# Patient Record
Sex: FEMALE | Race: WHITE | Employment: FULL TIME | URBAN - METROPOLITAN AREA
[De-identification: names, ages, dates, MRNs, and addresses within clinical notes are randomized per-mention and may not be internally consistent; named-entity substitution may affect disease eponyms.]

---

## 2023-08-21 ENCOUNTER — APPOINTMENT (OUTPATIENT)
Dept: GENERAL RADIOLOGY | Age: 58
DRG: 065 | End: 2023-08-21
Payer: COMMERCIAL

## 2023-08-21 ENCOUNTER — APPOINTMENT (OUTPATIENT)
Dept: CT IMAGING | Age: 58
DRG: 065 | End: 2023-08-21
Payer: COMMERCIAL

## 2023-08-21 ENCOUNTER — HOSPITAL ENCOUNTER (INPATIENT)
Age: 58
LOS: 2 days | Discharge: HOME | DRG: 065 | End: 2023-08-23
Attending: EMERGENCY MEDICINE | Admitting: INTERNAL MEDICINE
Payer: COMMERCIAL

## 2023-08-21 ENCOUNTER — APPOINTMENT (OUTPATIENT)
Dept: NUCLEAR MEDICINE | Age: 58
DRG: 065 | End: 2023-08-21
Payer: COMMERCIAL

## 2023-08-21 ENCOUNTER — APPOINTMENT (OUTPATIENT)
Dept: MRI IMAGING | Age: 58
DRG: 065 | End: 2023-08-21
Payer: COMMERCIAL

## 2023-08-21 DIAGNOSIS — R29.898 RIGHT ARM WEAKNESS: ICD-10-CM

## 2023-08-21 DIAGNOSIS — E04.2 MULTIPLE THYROID NODULES: ICD-10-CM

## 2023-08-21 DIAGNOSIS — I63.9 CEREBROVASCULAR ACCIDENT (CVA), UNSPECIFIED MECHANISM (HCC): ICD-10-CM

## 2023-08-21 DIAGNOSIS — M25.511 ACUTE PAIN OF RIGHT SHOULDER: Primary | ICD-10-CM

## 2023-08-21 DIAGNOSIS — R94.31 ABNORMAL EKG: ICD-10-CM

## 2023-08-21 LAB
ALBUMIN SERPL-MCNC: 4.1 G/DL (ref 3.4–5)
ALBUMIN/GLOB SERPL: 1.2 {RATIO} (ref 1.1–2.2)
ALP SERPL-CCNC: 109 U/L (ref 40–129)
ALT SERPL-CCNC: 27 U/L (ref 10–40)
ANION GAP SERPL CALCULATED.3IONS-SCNC: 14 MMOL/L (ref 3–16)
ANTI-XA UNFRAC HEPARIN: <0.1 IU/ML (ref 0.3–0.7)
APTT BLD: 23.2 SEC (ref 22.7–35.9)
AST SERPL-CCNC: 22 U/L (ref 15–37)
BASOPHILS # BLD: 0.1 K/UL (ref 0–0.2)
BASOPHILS NFR BLD: 0.7 %
BILIRUB SERPL-MCNC: 0.9 MG/DL (ref 0–1)
BUN SERPL-MCNC: 9 MG/DL (ref 7–20)
CALCIUM SERPL-MCNC: 9.4 MG/DL (ref 8.3–10.6)
CHLORIDE SERPL-SCNC: 97 MMOL/L (ref 99–110)
CHP ED QC CHECK: 184
CO2 SERPL-SCNC: 27 MMOL/L (ref 21–32)
CREAT SERPL-MCNC: 1.1 MG/DL (ref 0.6–1.1)
CRP SERPL-MCNC: 18.9 MG/L (ref 0–5.1)
DEPRECATED RDW RBC AUTO: 14.5 % (ref 12.4–15.4)
EKG ATRIAL RATE: 64 BPM
EKG ATRIAL RATE: 71 BPM
EKG DIAGNOSIS: NORMAL
EKG DIAGNOSIS: NORMAL
EKG P AXIS: 30 DEGREES
EKG P AXIS: 9 DEGREES
EKG P-R INTERVAL: 148 MS
EKG P-R INTERVAL: 170 MS
EKG Q-T INTERVAL: 420 MS
EKG Q-T INTERVAL: 428 MS
EKG QRS DURATION: 76 MS
EKG QRS DURATION: 78 MS
EKG QTC CALCULATION (BAZETT): 441 MS
EKG QTC CALCULATION (BAZETT): 456 MS
EKG R AXIS: 43 DEGREES
EKG R AXIS: 56 DEGREES
EKG T AXIS: 123 DEGREES
EKG T AXIS: 135 DEGREES
EKG VENTRICULAR RATE: 64 BPM
EKG VENTRICULAR RATE: 71 BPM
EOSINOPHIL # BLD: 0.1 K/UL (ref 0–0.6)
EOSINOPHIL NFR BLD: 1.7 %
ERYTHROCYTE [SEDIMENTATION RATE] IN BLOOD BY WESTERGREN METHOD: 29 MM/HR (ref 0–30)
GFR SERPLBLD CREATININE-BSD FMLA CKD-EPI: 58 ML/MIN/{1.73_M2}
GLUCOSE BLD-MCNC: 125 MG/DL (ref 70–99)
GLUCOSE BLD-MCNC: 131 MG/DL (ref 70–99)
GLUCOSE BLD-MCNC: 138 MG/DL (ref 70–99)
GLUCOSE BLD-MCNC: 184 MG/DL (ref 70–99)
GLUCOSE SERPL-MCNC: 199 MG/DL (ref 70–99)
HCT VFR BLD AUTO: 46 % (ref 36–48)
HGB BLD-MCNC: 15.5 G/DL (ref 12–16)
INR PPP: 0.99 (ref 0.84–1.16)
LV EF: 55 %
LV EF: 60 %
LVEF MODALITY: NORMAL
LVEF MODALITY: NORMAL
LYMPHOCYTES # BLD: 1.8 K/UL (ref 1–5.1)
LYMPHOCYTES NFR BLD: 21.6 %
MCH RBC QN AUTO: 28.3 PG (ref 26–34)
MCHC RBC AUTO-ENTMCNC: 33.7 G/DL (ref 31–36)
MCV RBC AUTO: 84.1 FL (ref 80–100)
MONOCYTES # BLD: 0.7 K/UL (ref 0–1.3)
MONOCYTES NFR BLD: 7.8 %
NEUTROPHILS # BLD: 5.7 K/UL (ref 1.7–7.7)
NEUTROPHILS NFR BLD: 68.2 %
PERFORMED ON: ABNORMAL
PLATELET # BLD AUTO: 211 K/UL (ref 135–450)
PMV BLD AUTO: 9.3 FL (ref 5–10.5)
POTASSIUM SERPL-SCNC: 3.3 MMOL/L (ref 3.5–5.1)
PROT SERPL-MCNC: 7.4 G/DL (ref 6.4–8.2)
PROTHROMBIN TIME: 13.1 SEC (ref 11.5–14.8)
RBC # BLD AUTO: 5.47 M/UL (ref 4–5.2)
SODIUM SERPL-SCNC: 138 MMOL/L (ref 136–145)
TROPONIN, HIGH SENSITIVITY: 7 NG/L (ref 0–14)
TROPONIN, HIGH SENSITIVITY: 8 NG/L (ref 0–14)
TROPONIN, HIGH SENSITIVITY: 8 NG/L (ref 0–14)
TROPONIN, HIGH SENSITIVITY: 9 NG/L (ref 0–14)
WBC # BLD AUTO: 8.4 K/UL (ref 4–11)

## 2023-08-21 PROCEDURE — 6370000000 HC RX 637 (ALT 250 FOR IP): Performed by: NURSE PRACTITIONER

## 2023-08-21 PROCEDURE — 85610 PROTHROMBIN TIME: CPT

## 2023-08-21 PROCEDURE — 6360000002 HC RX W HCPCS: Performed by: EMERGENCY MEDICINE

## 2023-08-21 PROCEDURE — 96374 THER/PROPH/DIAG INJ IV PUSH: CPT

## 2023-08-21 PROCEDURE — 70496 CT ANGIOGRAPHY HEAD: CPT

## 2023-08-21 PROCEDURE — 99285 EMERGENCY DEPT VISIT HI MDM: CPT

## 2023-08-21 PROCEDURE — 85652 RBC SED RATE AUTOMATED: CPT

## 2023-08-21 PROCEDURE — 36415 COLL VENOUS BLD VENIPUNCTURE: CPT

## 2023-08-21 PROCEDURE — 93306 TTE W/DOPPLER COMPLETE: CPT

## 2023-08-21 PROCEDURE — 6360000002 HC RX W HCPCS: Performed by: INTERNAL MEDICINE

## 2023-08-21 PROCEDURE — 85520 HEPARIN ASSAY: CPT

## 2023-08-21 PROCEDURE — 93005 ELECTROCARDIOGRAM TRACING: CPT | Performed by: EMERGENCY MEDICINE

## 2023-08-21 PROCEDURE — A9579 GAD-BASE MR CONTRAST NOS,1ML: HCPCS | Performed by: INTERNAL MEDICINE

## 2023-08-21 PROCEDURE — 6360000004 HC RX CONTRAST MEDICATION: Performed by: EMERGENCY MEDICINE

## 2023-08-21 PROCEDURE — 70450 CT HEAD/BRAIN W/O DYE: CPT

## 2023-08-21 PROCEDURE — 93010 ELECTROCARDIOGRAM REPORT: CPT | Performed by: INTERNAL MEDICINE

## 2023-08-21 PROCEDURE — 93017 CV STRESS TEST TRACING ONLY: CPT

## 2023-08-21 PROCEDURE — 70553 MRI BRAIN STEM W/O & W/DYE: CPT

## 2023-08-21 PROCEDURE — 3430000000 HC RX DIAGNOSTIC RADIOPHARMACEUTICAL: Performed by: INTERNAL MEDICINE

## 2023-08-21 PROCEDURE — 99254 IP/OBS CNSLTJ NEW/EST MOD 60: CPT | Performed by: INTERNAL MEDICINE

## 2023-08-21 PROCEDURE — 6360000004 HC RX CONTRAST MEDICATION: Performed by: INTERNAL MEDICINE

## 2023-08-21 PROCEDURE — 6370000000 HC RX 637 (ALT 250 FOR IP): Performed by: EMERGENCY MEDICINE

## 2023-08-21 PROCEDURE — 85025 COMPLETE CBC W/AUTO DIFF WBC: CPT

## 2023-08-21 PROCEDURE — 85730 THROMBOPLASTIN TIME PARTIAL: CPT

## 2023-08-21 PROCEDURE — 2060000000 HC ICU INTERMEDIATE R&B

## 2023-08-21 PROCEDURE — 84484 ASSAY OF TROPONIN QUANT: CPT

## 2023-08-21 PROCEDURE — 71045 X-RAY EXAM CHEST 1 VIEW: CPT

## 2023-08-21 PROCEDURE — 78452 HT MUSCLE IMAGE SPECT MULT: CPT

## 2023-08-21 PROCEDURE — A9502 TC99M TETROFOSMIN: HCPCS | Performed by: INTERNAL MEDICINE

## 2023-08-21 PROCEDURE — 86140 C-REACTIVE PROTEIN: CPT

## 2023-08-21 PROCEDURE — 75571 CT HRT W/O DYE W/CA TEST: CPT

## 2023-08-21 PROCEDURE — 80053 COMPREHEN METABOLIC PANEL: CPT

## 2023-08-21 PROCEDURE — 2580000003 HC RX 258: Performed by: NURSE PRACTITIONER

## 2023-08-21 RX ORDER — MORPHINE SULFATE 4 MG/ML
4 INJECTION, SOLUTION INTRAMUSCULAR; INTRAVENOUS ONCE
Status: DISCONTINUED | OUTPATIENT
Start: 2023-08-21 | End: 2023-08-23 | Stop reason: HOSPADM

## 2023-08-21 RX ORDER — ATORVASTATIN CALCIUM 80 MG/1
80 TABLET, FILM COATED ORAL DAILY
COMMUNITY

## 2023-08-21 RX ORDER — ATORVASTATIN CALCIUM 80 MG/1
80 TABLET, FILM COATED ORAL DAILY
Status: DISCONTINUED | OUTPATIENT
Start: 2023-08-21 | End: 2023-08-23 | Stop reason: HOSPADM

## 2023-08-21 RX ORDER — SODIUM CHLORIDE 9 MG/ML
INJECTION, SOLUTION INTRAVENOUS PRN
Status: DISCONTINUED | OUTPATIENT
Start: 2023-08-21 | End: 2023-08-23 | Stop reason: HOSPADM

## 2023-08-21 RX ORDER — HEPARIN SODIUM 10000 [USP'U]/100ML
930 INJECTION, SOLUTION INTRAVENOUS CONTINUOUS
Status: DISCONTINUED | OUTPATIENT
Start: 2023-08-21 | End: 2023-08-21

## 2023-08-21 RX ORDER — HEPARIN SODIUM 1000 [USP'U]/ML
4000 INJECTION, SOLUTION INTRAVENOUS; SUBCUTANEOUS PRN
Status: DISCONTINUED | OUTPATIENT
Start: 2023-08-21 | End: 2023-08-22 | Stop reason: ALTCHOICE

## 2023-08-21 RX ORDER — GLUCAGON 1 MG/ML
1 KIT INJECTION PRN
Status: DISCONTINUED | OUTPATIENT
Start: 2023-08-21 | End: 2023-08-23 | Stop reason: HOSPADM

## 2023-08-21 RX ORDER — ONDANSETRON 2 MG/ML
4 INJECTION INTRAMUSCULAR; INTRAVENOUS EVERY 6 HOURS PRN
Status: DISCONTINUED | OUTPATIENT
Start: 2023-08-21 | End: 2023-08-23 | Stop reason: HOSPADM

## 2023-08-21 RX ORDER — METHOCARBAMOL 750 MG/1
750 TABLET, FILM COATED ORAL 3 TIMES DAILY PRN
Status: DISCONTINUED | OUTPATIENT
Start: 2023-08-21 | End: 2023-08-22

## 2023-08-21 RX ORDER — HYDROCHLOROTHIAZIDE 25 MG/1
37.5 TABLET ORAL DAILY
Status: DISCONTINUED | OUTPATIENT
Start: 2023-08-21 | End: 2023-08-23 | Stop reason: HOSPADM

## 2023-08-21 RX ORDER — ASPIRIN 81 MG/1
81 TABLET, CHEWABLE ORAL DAILY
Status: DISCONTINUED | OUTPATIENT
Start: 2023-08-21 | End: 2023-08-22

## 2023-08-21 RX ORDER — ASPIRIN 81 MG/1
324 TABLET, CHEWABLE ORAL ONCE
Status: COMPLETED | OUTPATIENT
Start: 2023-08-21 | End: 2023-08-21

## 2023-08-21 RX ORDER — DEXTROSE MONOHYDRATE 100 MG/ML
INJECTION, SOLUTION INTRAVENOUS CONTINUOUS PRN
Status: DISCONTINUED | OUTPATIENT
Start: 2023-08-21 | End: 2023-08-23 | Stop reason: HOSPADM

## 2023-08-21 RX ORDER — ACETAMINOPHEN 325 MG/1
650 TABLET ORAL EVERY 6 HOURS PRN
Status: DISCONTINUED | OUTPATIENT
Start: 2023-08-21 | End: 2023-08-23 | Stop reason: HOSPADM

## 2023-08-21 RX ORDER — ACETAMINOPHEN 650 MG/1
650 SUPPOSITORY RECTAL EVERY 6 HOURS PRN
Status: DISCONTINUED | OUTPATIENT
Start: 2023-08-21 | End: 2023-08-23 | Stop reason: HOSPADM

## 2023-08-21 RX ORDER — HYDROCHLOROTHIAZIDE 25 MG/1
37.5 TABLET ORAL DAILY
COMMUNITY

## 2023-08-21 RX ORDER — HEPARIN SODIUM 1000 [USP'U]/ML
2000 INJECTION, SOLUTION INTRAVENOUS; SUBCUTANEOUS PRN
Status: DISCONTINUED | OUTPATIENT
Start: 2023-08-21 | End: 2023-08-22 | Stop reason: ALTCHOICE

## 2023-08-21 RX ORDER — SODIUM CHLORIDE 0.9 % (FLUSH) 0.9 %
5-40 SYRINGE (ML) INJECTION PRN
Status: DISCONTINUED | OUTPATIENT
Start: 2023-08-21 | End: 2023-08-23 | Stop reason: HOSPADM

## 2023-08-21 RX ORDER — INSULIN LISPRO 100 [IU]/ML
0-4 INJECTION, SOLUTION INTRAVENOUS; SUBCUTANEOUS
Status: DISCONTINUED | OUTPATIENT
Start: 2023-08-21 | End: 2023-08-23 | Stop reason: HOSPADM

## 2023-08-21 RX ORDER — REGADENOSON 0.08 MG/ML
0.4 INJECTION, SOLUTION INTRAVENOUS
Status: COMPLETED | OUTPATIENT
Start: 2023-08-21 | End: 2023-08-21

## 2023-08-21 RX ORDER — SODIUM CHLORIDE 0.9 % (FLUSH) 0.9 %
5-40 SYRINGE (ML) INJECTION EVERY 12 HOURS SCHEDULED
Status: DISCONTINUED | OUTPATIENT
Start: 2023-08-21 | End: 2023-08-23 | Stop reason: HOSPADM

## 2023-08-21 RX ORDER — HEPARIN SODIUM 1000 [USP'U]/ML
4000 INJECTION, SOLUTION INTRAVENOUS; SUBCUTANEOUS ONCE
Status: COMPLETED | OUTPATIENT
Start: 2023-08-21 | End: 2023-08-21

## 2023-08-21 RX ORDER — POLYETHYLENE GLYCOL 3350 17 G/17G
17 POWDER, FOR SOLUTION ORAL DAILY PRN
Status: DISCONTINUED | OUTPATIENT
Start: 2023-08-21 | End: 2023-08-23 | Stop reason: HOSPADM

## 2023-08-21 RX ORDER — INSULIN LISPRO 100 [IU]/ML
0-4 INJECTION, SOLUTION INTRAVENOUS; SUBCUTANEOUS NIGHTLY
Status: DISCONTINUED | OUTPATIENT
Start: 2023-08-21 | End: 2023-08-23 | Stop reason: HOSPADM

## 2023-08-21 RX ORDER — CARVEDILOL 6.25 MG/1
6.25 TABLET ORAL 2 TIMES DAILY WITH MEALS
Status: ON HOLD | COMMUNITY
End: 2023-08-23 | Stop reason: HOSPADM

## 2023-08-21 RX ORDER — NITROGLYCERIN 0.4 MG/1
0.4 TABLET SUBLINGUAL EVERY 5 MIN PRN
Status: DISCONTINUED | OUTPATIENT
Start: 2023-08-21 | End: 2023-08-23 | Stop reason: HOSPADM

## 2023-08-21 RX ORDER — CARVEDILOL 6.25 MG/1
6.25 TABLET ORAL 2 TIMES DAILY WITH MEALS
Status: DISCONTINUED | OUTPATIENT
Start: 2023-08-21 | End: 2023-08-22

## 2023-08-21 RX ORDER — ONDANSETRON 4 MG/1
4 TABLET, ORALLY DISINTEGRATING ORAL EVERY 8 HOURS PRN
Status: DISCONTINUED | OUTPATIENT
Start: 2023-08-21 | End: 2023-08-23 | Stop reason: HOSPADM

## 2023-08-21 RX ADMIN — HEPARIN SODIUM 4000 UNITS: 1000 INJECTION INTRAVENOUS; SUBCUTANEOUS at 09:23

## 2023-08-21 RX ADMIN — TETROFOSMIN 33.9 MILLICURIE: 1.38 INJECTION, POWDER, LYOPHILIZED, FOR SOLUTION INTRAVENOUS at 11:31

## 2023-08-21 RX ADMIN — ASPIRIN 81 MG 243 MG: 81 TABLET ORAL at 09:19

## 2023-08-21 RX ADMIN — HEPARIN SODIUM 930 UNITS/HR: 10000 INJECTION, SOLUTION INTRAVENOUS at 09:24

## 2023-08-21 RX ADMIN — GADOTERIDOL 20 ML: 279.3 INJECTION, SOLUTION INTRAVENOUS at 17:10

## 2023-08-21 RX ADMIN — REGADENOSON 0.4 MG: 0.08 INJECTION, SOLUTION INTRAVENOUS at 11:31

## 2023-08-21 RX ADMIN — CARVEDILOL 6.25 MG: 6.25 TABLET, FILM COATED ORAL at 16:47

## 2023-08-21 RX ADMIN — TETROFOSMIN 11.8 MILLICURIE: 1.38 INJECTION, POWDER, LYOPHILIZED, FOR SOLUTION INTRAVENOUS at 10:09

## 2023-08-21 RX ADMIN — METFORMIN HYDROCHLORIDE 500 MG: 500 TABLET, FILM COATED ORAL at 16:47

## 2023-08-21 RX ADMIN — Medication 10 ML: at 20:03

## 2023-08-21 RX ADMIN — METHOCARBAMOL 750 MG: 750 TABLET ORAL at 20:02

## 2023-08-21 RX ADMIN — IOPAMIDOL 75 ML: 755 INJECTION, SOLUTION INTRAVENOUS at 07:44

## 2023-08-21 RX ADMIN — SERTRALINE 50 MG: 50 TABLET, FILM COATED ORAL at 20:02

## 2023-08-21 ASSESSMENT — PAIN SCALES - GENERAL
PAINLEVEL_OUTOF10: 0

## 2023-08-21 NOTE — ED PROVIDER NOTES
EMERGENCY DEPARTMENT ENCOUNTER        Pt Name: Katey Riojas  MRN: [id number]  Birthdate 1965  Date of evaluation: 8/21/2023  Provider: Snow Schroeder MD  PCP: No primary care provider on file. CHIEF COMPLAINT       Chief Complaint   Patient presents with    Extremity Weakness    Shoulder Pain     Pt was on way to  and developed shoulder pain that goes into her neck, pt feels weaker than usual . States it started this am at 6pm . States due to pain and numbleness it is hard to move right arm       HISTORY OFPRESENT ILLNESS   (Location/Symptom, Timing/Onset, Context/Setting, Quality, Duration, Modifying Factors,Severity)  Note limiting factors. Katey Riojas is a 59 y.o. female presenting today due to concern for developing some right shoulder pain going up into the neck yesterday around 2 PM that lasted for a few moments but ultimately went away but then she woke up at 6 AM this morning complaining of the right shoulder and neck pain and also having weakness in her right arm. She had a prior stroke involving the right arm and therefore states she does not always write very well but felt like she had worsening weakness in the arm this morning. She denies any symptoms in the face or leg. No chest pain or shortness of breath. She does report having prior intracranial hemorrhage. She denies any significant headache. No falls or trauma. No weakness elsewhere on the body other than the right arm. No visual concerns. Due to concern for the persistent right shoulder and neck pain since waking up this morning, she came to the ED for further assessment. She went to bed at 9 PM last night feeling normal.        REVIEW OF SYSTEMS    (2-9 systems for level 4, 10 or more for level 5)     Review of Systems   Constitutional:  Negative for fever. Eyes:  Negative for visual disturbance. Respiratory:  Negative for chest tightness and shortness of breath.     Cardiovascular:  Negative for chest chloride flush 0.9 % injection 5-40 mL (10 mLs IntraVENous Given 8/22/23 2035)   sodium chloride flush 0.9 % injection 5-40 mL (has no administration in time range)   0.9 % sodium chloride infusion (has no administration in time range)   ondansetron (ZOFRAN-ODT) disintegrating tablet 4 mg (has no administration in time range)     Or   ondansetron (ZOFRAN) injection 4 mg (has no administration in time range)   acetaminophen (TYLENOL) tablet 650 mg (has no administration in time range)     Or   acetaminophen (TYLENOL) suppository 650 mg (has no administration in time range)   polyethylene glycol (GLYCOLAX) packet 17 g (has no administration in time range)   morphine sulfate (PF) injection 4 mg (4 mg IntraVENous Not Given 8/21/23 1959)   nitroGLYCERIN (NITROSTAT) SL tablet 0.4 mg (has no administration in time range)   glucose chewable tablet 16 g (has no administration in time range)   dextrose bolus 10% 125 mL (has no administration in time range)     Or   dextrose bolus 10% 250 mL (has no administration in time range)   glucagon injection 1 mg (has no administration in time range)   dextrose 10 % infusion (has no administration in time range)   insulin lispro (HUMALOG) injection vial 0-4 Units (0 Units SubCUTAneous Not Given 8/22/23 1639)   insulin lispro (HUMALOG) injection vial 0-4 Units (0 Units SubCUTAneous Not Given 8/22/23 2036)   ketorolac (TORADOL) injection 30 mg (30 mg IntraVENous Given 8/22/23 1815)   cyclobenzaprine (FLEXERIL) tablet 5 mg (has no administration in time range)   clopidogrel (PLAVIX) tablet 75 mg (75 mg Oral Given 8/22/23 1317)   enoxaparin Sodium (LOVENOX) injection 30 mg (30 mg SubCUTAneous Given 8/22/23 2035)   carvedilol (COREG) tablet 12.5 mg (12.5 mg Oral Given 8/22/23 1815)   iopamidol (ISOVUE-370) 76 % injection 75 mL (75 mLs IntraVENous Given 8/21/23 0744)   aspirin chewable tablet 324 mg (243 mg Oral Given 8/21/23 0919)   heparin (porcine) injection 4,000 Units (4,000 Units

## 2023-08-21 NOTE — ED NOTES
@3638 Dr. Akiko Sullivan consulted with  stroke team   @9820 Dr. Akiko Sullivan called Code stroke  @3020 called ct bed 1 ready   @0713 lab called      Arben Luna  08/21/23 9272

## 2023-08-21 NOTE — CONSULTS
Pharmacy to Manage Heparin Infusion per [facility]    Dx: CAD/STEMI/NSTEMI/UA/AFIB  Patient weight = 108.9 kg (will use adjusted wt if actual body weight > 120% ideal body weight). Dosing weight: 77.8 kg (ABW)  Baseline aPTT = 23.2 sec at 0720. Oral factor Xa-inhibitors may alter and elevate anti-Xa levels used for unfractionated heparin monitoring. As a result, anti-Xa monitoring is not accurate while Xa-inhibitor activity is detectable. Utilize aPTT monitoring when patient received an oral factor Xa-inhibitor (apixaban, betrixaban, edoxaban or rivaroxaban) within 72 hours prior to admission (please document last administration time). The goal is to allow a washout of oral factor Xa-inhibitors by using aPTT for 72 hours, then change to ant-Xa levels for UFH. Heparin (weight-based) Infusion: CAD/STEMI/NSTEMI/UA/AFib)   Heparin 60 units/kg IVP bolus (max 4,000 units) followed by Heparin infusion at 12 units/kg/hr (recommended max initial rate: 1000 units/hr). Recheck anti-Xa (unless aPTT being used) in 6 hours. Goal anti-Xa 0.3-0.7 IU/mL  Goal aPTT =  seconds. Pharmacy to manage Heparin - contact for questions. Heparin 60 units/kg IV x 1 (max 4,000 units), then 12 units/kg/hr (recommended max initial rate 1,000 units/hr). Adjust infusion rate based off anti-Xa results below. anti-Xa < 0.1    Heparin 60 units/kg bolus  Increase infusion by 4 units/kg/hr  anti-Xa 0.1-0.29 Heparin 30 units/kg bolus Increase infusion by 2 units/kg/hr  anti-Xa 0.3-0.7    No bolus No change   anti-Xa 0.71-0.8   No bolus Decrease infusion by 1 units/kg/hr  anti-Xa 0.81-0.99    No bolus Decrease infusion by 2 units/kg/hr  anti-Xa 1 or more     Hold heparin for 1 hour Decrease infusion by 3 units/kg/hr    Obtain anti-Xa hours after bolus and 6 hours after any dose change until two consecutive therapeutic anti-Xa are achieved- then daily.     Daniela Rawls, PharmD 8/21/2023  9:15 AM

## 2023-08-21 NOTE — ED NOTES
@8553 called  stroke team for consult per Dr. Akiko Sullivan   RE:right arm/shoulder pain  @0791  stroke team called back and spoke to Dr. Annita Muñoz  08/21/23 9575

## 2023-08-21 NOTE — PROGRESS NOTES
A Sylvia Myoview stress test was completed on this patient as ordered. The patient tolerated the procedure well. Awaiting stress imaging at this time.

## 2023-08-21 NOTE — ED NOTES
Report given to C4 RN. Questions answered, care transferred. Pt still in stress at this time.  RN notified Stress lab as well to take pt upstairs to C4 when finished with testing     Wade Miller RN  08/21/23 1037

## 2023-08-21 NOTE — CONSULTS
[address] [place] [address]  [phone]      Attending Physician: Yogi Johnson MD  Reason for Consultation/Chief Complaint: Right arm pain    Subjective   History of Present Illness:  Pamela Morrissey. Kalee Nettles is a 59 y.o. patient who presented to the hospital with complaints of right arm pain since yesterday, patient says this was somewhat sudden onset, she noted no falls or trauma. She describes the pain mainly in the right posterior shoulder region as well as into the right posterior neck region. She has not had such symptoms before. She denies chest pain or left arm pain. She notes occasional nausea but no sweating. She says when the pain became severe she did notice associated shortness of breath but otherwise has no shortness of breath. Recently she has been working with her primary care physician for uncontrolled hypertension and had hydrochlorothiazide doubled and had the addition of carvedilol with improvement in blood pressure. Her daughter is at the bedside is able to provide/confirm history. In the emergency room EKG initially raised concern for inferior ST elevation as well as lateral ST/T changes. Initially old EKGs were not available for review however patient did note she had a recent work-up and has had a history of 3 strokes most recently in June 2023 at [facility] and those records were reviewed and reports that indicate she has had abnormalities of her inferolateral region on her EKG previously. Initial work-up included CT scan of the head which was negative for stroke. Troponin was negative. Patient denies prior cardiac disease or cardiology evaluation. Chronically, patient has had hypertension, as noted above, and recent changes in medications with improvement in control, she has had hypercholesterolemia, has been on statin. She has had a history of stroke as noted above.   Symptoms of the stroke have included right arm numbness as well as with difficulty changes/inversions, this has been noted on prior EKG from 2022 at [facility] with a written report indicating inferior infarct. Echo:    June 2023 at [facility] with normal left ventricular function    Stress Test:    2022 at [facility] was negative    Cath: None    Studies:     Cxr    IMPRESSION:  Lateral left basilar opacity, which could reflect atelectasis, airspace  disease, or soft tissue attenuation artifact. There is also a possible 7 mm  left apical pulmonary nodule. PA and lateral chest radiograph versus CT  could be performed for further evaluation. I have reviewed labs and imaging/xray/diagnostic testing in this note. Assessment and Plan       Abnormal EKG, this has been reported in a similar fashion and prior EKG, would evaluate further due to this as well as right arm pain which is an atypical possible anginal equivalent although this seems less likely due to reproducibility of pain with palpation. As such, would plan on serial troponin, plan on stress testing. Discussed with patient and daughter at bedside, they understand this approach including risk/benefits/alternatives/outcomes and wished to proceed this manner. It would be reasonable to start heparin in the interim. Continue aspirin    Hypertension, resume home medication    Hyperlipidemia, resume statin      History of stroke, consider follow-up MRI, will go ahead and place orders for that    Addendum,     Stress test reviewed, d/w pt and daughter, plan outpt f/u, do not feel cath highly indicated at this time given neg trop, nml lvef on echo and small defect on stress test.  Plan on outpt f/u to reassess this. Pt/family agree. Can consider ct ca score either as inpt or outpt. No further inpatient cardiac workup or treatment planned, will sign off, please call with questions. Thank you for allowing us to participate in the care of Fatimah Ross Guillaume.  Please call me with any questions ([phone])

## 2023-08-21 NOTE — H&P
Hospital Medicine History & Physical      Date of Admission: 8/21/2023    Date of Service:  Pt seen/examined on 8/21/2023    [x]Admitted to Inpatient with expected LOS greater than two midnights due to medical therapy. []Placed in Observation status. Chief Admission Complaint:  Left neck and shoulder pain     Presenting Admission History: This is a pleasant 17-year-old female who presented to the [facility] emergency department with complaints of right neck and arm pain that started yesterday. She says it was sudden on onset and she had no trauma or falls. She tells me that the pain is mainly in the right posterior shoulder region as well as into the right posterior neck area it does radiate down her arms. .  She has never had these symptoms prior to this presentation. She tells me she does not have any chest pain, left arm pain, shortness of breath, fevers, chills, cough or bowel or bladder issues. She does say that she has some occasional nausea but she believes this is brought on by this severe pain. She also mention that she has some shortness of breath when the pain presents. She has primary hypertension and she has been working closely with her primary care physician Dr. Sneha Uribe of  and recently was placed on carvedilol 6.25 and hydrochlorothiazide seen was doubled in strength to 37.25. Her daughter is at the bedside and she is able to provide and confirm this history. In the emergency room EKG raised concerns for inferior ST elevation as well as lateral ST/T wave changes. Unfortunately there were no EKGs available for review however the patient did note she had a recent work-up and has had a history of 3 strokes most recently in June 2023 at the [facility]. I was able to finally review those records. She did have a lacunar stroke and a left frontal lobe posterior corona radiata CVA.   She does have some abnormalities seen on her EKG previously in the inferior BRAIN/VENTRICLES: There is no acute intracranial hemorrhage, mass effect or midline shift. No abnormal extra-axial fluid collection. The gray-white differentiation is maintained without evidence of an acute infarct. There is no evidence of hydrocephalus. Moderate to extensive periventricular deep and subcortical white matter low attenuation noted. Old lacune is noted in bilateral basal ganglia ORBITS: The visualized portion of the orbits demonstrate no acute abnormality. SINUSES: 3 cm lobulated retention cyst left maxillary sinus. Partial opacification right sphenoid sinus. Partial opacification of few left mastoid air cells. The other paranasal sinuses are clear. SOFT TISSUES/SKULL:  No acute abnormality of the visualized skull or soft tissues. No acute intracranial abnormality. Moderate to extensive white matter changes in the brain, likely related to chronic small vessel ischemia. XR CHEST PORTABLE    Result Date: 8/21/2023  EXAMINATION: ONE XRAY VIEW OF THE CHEST 8/21/2023 7:25 am COMPARISON: None. HISTORY: ORDERING SYSTEM PROVIDED HISTORY: right shoulder pain TECHNOLOGIST PROVIDED HISTORY: Reason for exam:->right shoulder pain Reason for Exam: right shoulder pain, h/o stroke FINDINGS: Cardiac leads project over the chest.  Attenuation by soft tissue in the lower hemithoraces. Lateral left base is not well visualized. Elsewhere, no focal airspace disease is seen. No pneumothorax. Cardiac and mediastinal silhouettes are reflective of patient rotation. 7 mm nodular density projects over the anterior left 1st rib, medial left clavicle. Lateral left basilar opacity, which could reflect atelectasis, airspace disease, or soft tissue attenuation artifact. There is also a possible 7 mm left apical pulmonary nodule. PA and lateral chest radiograph versus CT could be performed for further evaluation.      CTA HEAD NECK W CONTRAST    Result Date: 8/21/2023  EXAMINATION: CTA OF THE HEAD AND NECK

## 2023-08-21 NOTE — ED NOTES
Pt being taken to stress lab via stretcher by cardiac nurse, Madalyn Scott. Pt in NAD, RR even and unlabored. VSS upon departure.      Jose Antonio Hernandez RN  08/21/23 1000

## 2023-08-21 NOTE — ED NOTES
@6635 called cardiology for consult per Dr. Svitlana Baum   RE:right shoulder pain  @5083 Dr. Jose M Campbell called back and spoke to Dr. Monik Wong  08/21/23 2054

## 2023-08-22 ENCOUNTER — APPOINTMENT (OUTPATIENT)
Dept: GENERAL RADIOLOGY | Age: 58
DRG: 065 | End: 2023-08-22
Payer: COMMERCIAL

## 2023-08-22 ENCOUNTER — APPOINTMENT (OUTPATIENT)
Dept: CT IMAGING | Age: 58
DRG: 065 | End: 2023-08-22
Payer: COMMERCIAL

## 2023-08-22 ENCOUNTER — APPOINTMENT (OUTPATIENT)
Dept: MRI IMAGING | Age: 58
DRG: 065 | End: 2023-08-22
Payer: COMMERCIAL

## 2023-08-22 LAB
ALBUMIN SERPL-MCNC: 3.6 G/DL (ref 3.4–5)
ALBUMIN/GLOB SERPL: 1.1 {RATIO} (ref 1.1–2.2)
ALP SERPL-CCNC: 98 U/L (ref 40–129)
ALT SERPL-CCNC: 23 U/L (ref 10–40)
ANION GAP SERPL CALCULATED.3IONS-SCNC: 11 MMOL/L (ref 3–16)
AST SERPL-CCNC: 18 U/L (ref 15–37)
BILIRUB SERPL-MCNC: 0.7 MG/DL (ref 0–1)
BUN SERPL-MCNC: 11 MG/DL (ref 7–20)
CALCIUM SERPL-MCNC: 9 MG/DL (ref 8.3–10.6)
CHLORIDE SERPL-SCNC: 99 MMOL/L (ref 99–110)
CHOLEST SERPL-MCNC: 128 MG/DL (ref 0–199)
CO2 SERPL-SCNC: 28 MMOL/L (ref 21–32)
CREAT SERPL-MCNC: 1 MG/DL (ref 0.6–1.1)
DEPRECATED RDW RBC AUTO: 14.9 % (ref 12.4–15.4)
EST. AVERAGE GLUCOSE BLD GHB EST-MCNC: 208.7 MG/DL
GFR SERPLBLD CREATININE-BSD FMLA CKD-EPI: >60 ML/MIN/{1.73_M2}
GLUCOSE BLD-MCNC: 161 MG/DL (ref 70–99)
GLUCOSE BLD-MCNC: 166 MG/DL (ref 70–99)
GLUCOSE BLD-MCNC: 177 MG/DL (ref 70–99)
GLUCOSE BLD-MCNC: 179 MG/DL (ref 70–99)
GLUCOSE SERPL-MCNC: 169 MG/DL (ref 70–99)
HBA1C MFR BLD: 8.9 %
HCT VFR BLD AUTO: 42.7 % (ref 36–48)
HDLC SERPL-MCNC: 31 MG/DL (ref 40–60)
HGB BLD-MCNC: 14.5 G/DL (ref 12–16)
LDLC SERPL CALC-MCNC: 71 MG/DL
MAGNESIUM SERPL-MCNC: 2.3 MG/DL (ref 1.8–2.4)
MCH RBC QN AUTO: 28.9 PG (ref 26–34)
MCHC RBC AUTO-ENTMCNC: 34 G/DL (ref 31–36)
MCV RBC AUTO: 84.9 FL (ref 80–100)
PERFORMED ON: ABNORMAL
PLATELET # BLD AUTO: 166 K/UL (ref 135–450)
PMV BLD AUTO: 9.2 FL (ref 5–10.5)
POTASSIUM SERPL-SCNC: 3.4 MMOL/L (ref 3.5–5.1)
PROT SERPL-MCNC: 7 G/DL (ref 6.4–8.2)
RBC # BLD AUTO: 5.03 M/UL (ref 4–5.2)
SODIUM SERPL-SCNC: 138 MMOL/L (ref 136–145)
TRIGL SERPL-MCNC: 130 MG/DL (ref 0–150)
TSH SERPL DL<=0.005 MIU/L-ACNC: 1.2 UIU/ML (ref 0.27–4.2)
VLDLC SERPL CALC-MCNC: 26 MG/DL
WBC # BLD AUTO: 8.6 K/UL (ref 4–11)

## 2023-08-22 PROCEDURE — 6370000000 HC RX 637 (ALT 250 FOR IP): Performed by: NURSE PRACTITIONER

## 2023-08-22 PROCEDURE — 80053 COMPREHEN METABOLIC PANEL: CPT

## 2023-08-22 PROCEDURE — 72040 X-RAY EXAM NECK SPINE 2-3 VW: CPT

## 2023-08-22 PROCEDURE — 80061 LIPID PANEL: CPT

## 2023-08-22 PROCEDURE — 72141 MRI NECK SPINE W/O DYE: CPT

## 2023-08-22 PROCEDURE — 83735 ASSAY OF MAGNESIUM: CPT

## 2023-08-22 PROCEDURE — 99223 1ST HOSP IP/OBS HIGH 75: CPT | Performed by: NURSE PRACTITIONER

## 2023-08-22 PROCEDURE — 84443 ASSAY THYROID STIM HORMONE: CPT

## 2023-08-22 PROCEDURE — 83036 HEMOGLOBIN GLYCOSYLATED A1C: CPT

## 2023-08-22 PROCEDURE — 36415 COLL VENOUS BLD VENIPUNCTURE: CPT

## 2023-08-22 PROCEDURE — 6360000002 HC RX W HCPCS: Performed by: NURSE PRACTITIONER

## 2023-08-22 PROCEDURE — 2060000000 HC ICU INTERMEDIATE R&B

## 2023-08-22 PROCEDURE — 2580000003 HC RX 258: Performed by: NURSE PRACTITIONER

## 2023-08-22 PROCEDURE — 71250 CT THORAX DX C-: CPT

## 2023-08-22 PROCEDURE — 85027 COMPLETE CBC AUTOMATED: CPT

## 2023-08-22 RX ORDER — CYCLOBENZAPRINE HCL 10 MG
5 TABLET ORAL 3 TIMES DAILY PRN
Status: DISCONTINUED | OUTPATIENT
Start: 2023-08-22 | End: 2023-08-23 | Stop reason: HOSPADM

## 2023-08-22 RX ORDER — CARVEDILOL 6.25 MG/1
12.5 TABLET ORAL 2 TIMES DAILY WITH MEALS
Status: DISCONTINUED | OUTPATIENT
Start: 2023-08-22 | End: 2023-08-23 | Stop reason: HOSPADM

## 2023-08-22 RX ORDER — KETOROLAC TROMETHAMINE 30 MG/ML
30 INJECTION, SOLUTION INTRAMUSCULAR; INTRAVENOUS EVERY 6 HOURS PRN
Status: DISCONTINUED | OUTPATIENT
Start: 2023-08-22 | End: 2023-08-23 | Stop reason: HOSPADM

## 2023-08-22 RX ORDER — CLOPIDOGREL BISULFATE 75 MG/1
75 TABLET ORAL DAILY
Status: DISCONTINUED | OUTPATIENT
Start: 2023-08-22 | End: 2023-08-23 | Stop reason: HOSPADM

## 2023-08-22 RX ORDER — ENOXAPARIN SODIUM 100 MG/ML
30 INJECTION SUBCUTANEOUS 2 TIMES DAILY
Status: DISCONTINUED | OUTPATIENT
Start: 2023-08-22 | End: 2023-08-23 | Stop reason: HOSPADM

## 2023-08-22 RX ORDER — POTASSIUM CHLORIDE 20 MEQ/1
40 TABLET, EXTENDED RELEASE ORAL ONCE
Status: COMPLETED | OUTPATIENT
Start: 2023-08-22 | End: 2023-08-22

## 2023-08-22 RX ADMIN — KETOROLAC TROMETHAMINE 30 MG: 30 INJECTION, SOLUTION INTRAMUSCULAR at 18:15

## 2023-08-22 RX ADMIN — KETOROLAC TROMETHAMINE 30 MG: 30 INJECTION, SOLUTION INTRAMUSCULAR at 09:33

## 2023-08-22 RX ADMIN — ATORVASTATIN CALCIUM 80 MG: 80 TABLET, FILM COATED ORAL at 09:33

## 2023-08-22 RX ADMIN — CARVEDILOL 12.5 MG: 6.25 TABLET, FILM COATED ORAL at 18:15

## 2023-08-22 RX ADMIN — POTASSIUM CHLORIDE 40 MEQ: 1500 TABLET, EXTENDED RELEASE ORAL at 09:33

## 2023-08-22 RX ADMIN — METHOCARBAMOL 750 MG: 750 TABLET ORAL at 05:24

## 2023-08-22 RX ADMIN — CLOPIDOGREL BISULFATE 75 MG: 75 TABLET ORAL at 13:17

## 2023-08-22 RX ADMIN — Medication 10 ML: at 20:35

## 2023-08-22 RX ADMIN — ENOXAPARIN SODIUM 30 MG: 100 INJECTION SUBCUTANEOUS at 13:17

## 2023-08-22 RX ADMIN — ENOXAPARIN SODIUM 30 MG: 100 INJECTION SUBCUTANEOUS at 20:35

## 2023-08-22 RX ADMIN — HYDROCHLOROTHIAZIDE 37.5 MG: 25 TABLET ORAL at 09:34

## 2023-08-22 RX ADMIN — Medication 10 ML: at 09:34

## 2023-08-22 RX ADMIN — ASPIRIN 81 MG 81 MG: 81 TABLET ORAL at 09:33

## 2023-08-22 RX ADMIN — SERTRALINE 50 MG: 50 TABLET, FILM COATED ORAL at 20:35

## 2023-08-22 RX ADMIN — CARVEDILOL 6.25 MG: 6.25 TABLET, FILM COATED ORAL at 09:33

## 2023-08-22 ASSESSMENT — PAIN SCALES - GENERAL
PAINLEVEL_OUTOF10: 5
PAINLEVEL_OUTOF10: 5
PAINLEVEL_OUTOF10: 2
PAINLEVEL_OUTOF10: 2

## 2023-08-22 ASSESSMENT — PAIN DESCRIPTION - ORIENTATION: ORIENTATION: RIGHT;LOWER

## 2023-08-22 ASSESSMENT — PAIN DESCRIPTION - LOCATION: LOCATION: BACK;ARM;NECK

## 2023-08-22 ASSESSMENT — PAIN DESCRIPTION - DESCRIPTORS: DESCRIPTORS: TIGHTNESS

## 2023-08-22 NOTE — CONSULTS
Consult Placed     Who: /Neurology   Date:  Time:     Electronically signed by Lusi Sutherland on 8/22/2023 at 8:51 AM

## 2023-08-22 NOTE — PROGRESS NOTES
08/22/23 1617   Encounter Summary   Encounter Overview/Reason  Advance Care Planning   Service Provided For: Patient   Referral/Consult From: Nurse   Support System Children   Last Encounter  08/22/23  (ACP conversation, emotional support)   Complexity of Encounter Low   Begin Time 1550   End Time  1615   Total Time Calculated 25 min   Advance Care Planning   Type ACP conversation

## 2023-08-22 NOTE — PROGRESS NOTES
the inferior lateral region. In the emergency department CT of the head was negative for any acute abnormalities, CTA of the head neck revealed a thyroid nodule with the patient states that she knows that she has had since 2009 and some ossification of ligament C2-C3 and C3-C4 with spinal stenosis. Her troponins were negative. She tells me that she has never had any previous cardiac disease, CAD or a cardiac evaluation in the past.      She does have a past medical history of CVA x3, normal axonal GBS, diabetes mellitus type 2, hyperlipidemia, primary hypertension, neurogenic bladder, and this known thyroid nodule. Assessment/Plan:      Current Principal Problem:  Nonspecific ST-T wave electrocardiographic changes    Abnormal EKG  -This has been reported on prior EKGs, however this needs to be evaluated further due to this new neck and right arm pain which is atypical possible angina. However this pain is reproducible with palpation so it seems less likely that this is cardiac in nature.  -Serial troponin 9 ----> 7  -Stress test  revealed:    Normal LVEF >60%   Normal wall motion    Small apical defect noted, this is more likely due to attenuation artifact,    less likely ischemia   -Heparin drip stopped  -Continue aspirin and atorvastatin  -ECHO:  Left ventricular systolic function is normal with a visually estimated ejection fraction of 55%  The left ventricle is normal in size with mild concentric hypertrophy. No obvious regional wall motion abnormalities noted. Left ventricular function/wall motion is difficult to estimate due to poor endocardial visualization. Grade I diastolic dysfunction with normal LV pressure There is no evidence of a left ventricular thrombus . - A1c pending  -Lipid panel Chol 128, Trigs, 130, HDL 31 and TSH 1.2  -Cardiology consulted; appreciate their assistance.      Primary hypertension  -160/  -Continue home medications of hydrochlorothiazide 37.5 mg daily and includes:  Ct H?N, Stress test, Mri brain  [x] Data Review (any 3)  [] Collateral history obtained from:    [x] All available Consultant notes from yesterday/today were reviewed  [x] All current labs were reviewed and interpreted for clinical significance   [x] Appropriate follow-up labs were ordered    Medications:  Personally reviewed in detail in conjunction w/ labs as documented for evidence of drug toxicity. Infusion Medications    sodium chloride      dextrose       Scheduled Medications    clopidogrel  75 mg Oral Daily    atorvastatin  80 mg Oral Daily    carvedilol  6.25 mg Oral BID WC    hydroCHLOROthiazide  37.5 mg Oral Daily    sertraline  50 mg Oral QHS    sodium chloride flush  5-40 mL IntraVENous 2 times per day    morphine  4 mg IntraVENous Once    insulin lispro  0-4 Units SubCUTAneous TID WC    insulin lispro  0-4 Units SubCUTAneous Nightly     PRN Meds: ketorolac, cyclobenzaprine, perflutren lipid microspheres, heparin (porcine), heparin (porcine), sodium chloride flush, sodium chloride, ondansetron **OR** ondansetron, acetaminophen **OR** acetaminophen, polyethylene glycol, nitroGLYCERIN, glucose, dextrose bolus **OR** dextrose bolus, glucagon (rDNA), dextrose     Labs:  Personally reviewed and interpreted for clinical significance. Recent Labs     08/21/23  0720 08/22/23  0458   WBC 8.4 8.6   HGB 15.5 14.5   HCT 46.0 42.7    166     Recent Labs     08/21/23  0720 08/22/23  0459    138   K 3.3* 3.4*   CL 97* 99   CO2 27 28   BUN 9 11   CREATININE 1.1 1.0   CALCIUM 9.4 9.0   MG  --  2.30     Recent Labs     08/21/23  0909 08/21/23  1331 08/21/23  1448   TROPHS 8 8 7     No results for input(s): LABA1C in the last 72 hours.   Recent Labs     08/21/23  0720 08/22/23  0459   AST 22 18   ALT 27 23   BILITOT 0.9 0.7   ALKPHOS 109 98     Recent Labs     08/21/23  0720   INR 0.99       Urine Cultures: No results found for: LABURIN  Blood Cultures: No results found for: BC  No results

## 2023-08-22 NOTE — ACP (ADVANCE CARE PLANNING)
ACP conversation . Pt will contact [phone] [facility] [address] [facility] when ready to complete.

## 2023-08-22 NOTE — CONSULTS
Consult Placed     Who: Rebeca Neurosurgery   Date:  Time:     Electronically signed by Clyde Vernon on 8/22/2023 at 9:47 AM

## 2023-08-22 NOTE — CONSULTS
Consult Placed     Who: Julian Vance   Date:  Time:     Electronically signed by Daya Locke on 8/22/2023 at 9:19 AM

## 2023-08-22 NOTE — CARE COORDINATION
Case Management Assessment  Initial Evaluation    Date/Time of Evaluation: 8/22/2023 12:58 PM  Assessment Completed by: Aroldo Mosqueda RN    If patient is discharged prior to next notation, then this note serves as note for discharge by case management. Patient Name: Valente Galaviz                   YOB: 1965  Diagnosis: Multiple thyroid nodules [E04.2]  Abnormal EKG [R94.31]  Nonspecific ST-T wave electrocardiographic changes [R94.31]  Right arm weakness [R29.898]  Acute pain of right shoulder [M25.511]                   Date / Time: 8/21/2023  7:10 AM    Patient Admission Status: Inpatient   Readmission Risk (Low < 19, Mod (19-27), High > 27): Readmission Risk Score: 5.5    Current PCP: No primary care provider on file. PCP verified by CM? Yes Jordan Polk)    Chart Reviewed: Yes      History Provided by: Patient  Patient Orientation: Alert and Oriented, Person, Place, Situation, Self    Patient Cognition: Alert    Hospitalization in the last 30 days (Readmission):  No    If yes, Readmission Assessment in CM Navigator will be completed. Advance Directives:      Code Status: Full Code   Patient's Primary Decision Maker is: Patient Declined (Legal Next of Kin Remains as Decision Maker)      Discharge Planning:    Patient lives with: Children Type of Home: House  Primary Care Giver: Self  Patient Support Systems include: Children   Current Financial resources: None  Current community resources: None  Current services prior to admission: None            Current DME:              Type of Home Care services:  None    ADLS  Prior functional level: Independent in ADLs/IADLs  Current functional level: Independent in ADLs/IADLs    PT AM-PAC:   /24  OT AM-PAC:   /24    Family can provide assistance at DC: Yes  Would you like Case Management to discuss the discharge plan with any other family members/significant others, and if so, who?  No  Plans to Return to Present Housing: Yes  Other

## 2023-08-23 ENCOUNTER — APPOINTMENT (OUTPATIENT)
Dept: GENERAL RADIOLOGY | Age: 58
DRG: 065 | End: 2023-08-23
Payer: COMMERCIAL

## 2023-08-23 ENCOUNTER — TELEPHONE (OUTPATIENT)
Dept: CARDIOLOGY CLINIC | Age: 58
End: 2023-08-23

## 2023-08-23 VITALS
TEMPERATURE: 98.1 F | HEIGHT: 65 IN | WEIGHT: 248.2 LBS | DIASTOLIC BLOOD PRESSURE: 94 MMHG | OXYGEN SATURATION: 97 % | SYSTOLIC BLOOD PRESSURE: 147 MMHG | HEART RATE: 74 BPM | RESPIRATION RATE: 16 BRPM | BODY MASS INDEX: 41.35 KG/M2

## 2023-08-23 LAB
ANION GAP SERPL CALCULATED.3IONS-SCNC: 10 MMOL/L (ref 3–16)
BUN SERPL-MCNC: 21 MG/DL (ref 7–20)
CALCIUM SERPL-MCNC: 9 MG/DL (ref 8.3–10.6)
CHLORIDE SERPL-SCNC: 99 MMOL/L (ref 99–110)
CO2 SERPL-SCNC: 25 MMOL/L (ref 21–32)
CREAT SERPL-MCNC: 1.2 MG/DL (ref 0.6–1.1)
DEPRECATED RDW RBC AUTO: 14.5 % (ref 12.4–15.4)
GFR SERPLBLD CREATININE-BSD FMLA CKD-EPI: 52 ML/MIN/{1.73_M2}
GLUCOSE BLD-MCNC: 148 MG/DL (ref 70–99)
GLUCOSE BLD-MCNC: 176 MG/DL (ref 70–99)
GLUCOSE SERPL-MCNC: 183 MG/DL (ref 70–99)
HCT VFR BLD AUTO: 42.3 % (ref 36–48)
HGB BLD-MCNC: 14.3 G/DL (ref 12–16)
MCH RBC QN AUTO: 28.6 PG (ref 26–34)
MCHC RBC AUTO-ENTMCNC: 33.8 G/DL (ref 31–36)
MCV RBC AUTO: 84.8 FL (ref 80–100)
PERFORMED ON: ABNORMAL
PERFORMED ON: ABNORMAL
PLATELET # BLD AUTO: 140 K/UL (ref 135–450)
PMV BLD AUTO: 9.7 FL (ref 5–10.5)
POTASSIUM SERPL-SCNC: 3.6 MMOL/L (ref 3.5–5.1)
RBC # BLD AUTO: 4.99 M/UL (ref 4–5.2)
SODIUM SERPL-SCNC: 134 MMOL/L (ref 136–145)
WBC # BLD AUTO: 5.7 K/UL (ref 4–11)

## 2023-08-23 PROCEDURE — 2580000003 HC RX 258: Performed by: NURSE PRACTITIONER

## 2023-08-23 PROCEDURE — 85027 COMPLETE CBC AUTOMATED: CPT

## 2023-08-23 PROCEDURE — 36415 COLL VENOUS BLD VENIPUNCTURE: CPT

## 2023-08-23 PROCEDURE — 6370000000 HC RX 637 (ALT 250 FOR IP): Performed by: NURSE PRACTITIONER

## 2023-08-23 PROCEDURE — 6360000002 HC RX W HCPCS: Performed by: NURSE PRACTITIONER

## 2023-08-23 PROCEDURE — 80048 BASIC METABOLIC PNL TOTAL CA: CPT

## 2023-08-23 PROCEDURE — 73030 X-RAY EXAM OF SHOULDER: CPT

## 2023-08-23 RX ORDER — CLOPIDOGREL BISULFATE 75 MG/1
75 TABLET ORAL DAILY
Qty: 30 TABLET | Refills: 3 | Status: SHIPPED | OUTPATIENT
Start: 2023-08-24

## 2023-08-23 RX ORDER — CARVEDILOL 12.5 MG/1
12.5 TABLET ORAL 2 TIMES DAILY WITH MEALS
Qty: 60 TABLET | Refills: 3 | Status: SHIPPED | OUTPATIENT
Start: 2023-08-23

## 2023-08-23 RX ORDER — CYCLOBENZAPRINE HCL 5 MG
5 TABLET ORAL 3 TIMES DAILY PRN
Qty: 10 TABLET | Refills: 0 | Status: SHIPPED | OUTPATIENT
Start: 2023-08-23 | End: 2023-09-02

## 2023-08-23 RX ORDER — PREDNISONE 20 MG/1
40 TABLET ORAL DAILY
Qty: 10 TABLET | Refills: 0 | Status: SHIPPED | OUTPATIENT
Start: 2023-08-23 | End: 2023-08-28

## 2023-08-23 RX ORDER — PREDNISONE 20 MG/1
40 TABLET ORAL DAILY
Status: DISCONTINUED | OUTPATIENT
Start: 2023-08-23 | End: 2023-08-23 | Stop reason: HOSPADM

## 2023-08-23 RX ADMIN — ACETAMINOPHEN 650 MG: 325 TABLET ORAL at 09:57

## 2023-08-23 RX ADMIN — Medication 10 ML: at 08:55

## 2023-08-23 RX ADMIN — CLOPIDOGREL BISULFATE 75 MG: 75 TABLET ORAL at 08:55

## 2023-08-23 RX ADMIN — PREDNISONE 40 MG: 20 TABLET ORAL at 14:06

## 2023-08-23 RX ADMIN — ENOXAPARIN SODIUM 30 MG: 100 INJECTION SUBCUTANEOUS at 08:55

## 2023-08-23 RX ADMIN — CARVEDILOL 12.5 MG: 6.25 TABLET, FILM COATED ORAL at 08:55

## 2023-08-23 RX ADMIN — HYDROCHLOROTHIAZIDE 37.5 MG: 25 TABLET ORAL at 08:55

## 2023-08-23 RX ADMIN — ATORVASTATIN CALCIUM 80 MG: 80 TABLET, FILM COATED ORAL at 08:54

## 2023-08-23 RX ADMIN — KETOROLAC TROMETHAMINE 30 MG: 30 INJECTION, SOLUTION INTRAMUSCULAR at 08:55

## 2023-08-23 ASSESSMENT — PAIN DESCRIPTION - ORIENTATION: ORIENTATION: RIGHT

## 2023-08-23 ASSESSMENT — ENCOUNTER SYMPTOMS
SHORTNESS OF BREATH: 0
BACK PAIN: 0
CHEST TIGHTNESS: 0
ABDOMINAL PAIN: 0
VOMITING: 0

## 2023-08-23 ASSESSMENT — PAIN DESCRIPTION - LOCATION: LOCATION: HEAD;NECK

## 2023-08-23 ASSESSMENT — PAIN DESCRIPTION - DESCRIPTORS: DESCRIPTORS: ACHING

## 2023-08-23 ASSESSMENT — PAIN SCALES - GENERAL: PAINLEVEL_OUTOF10: 6

## 2023-08-23 NOTE — TELEPHONE ENCOUNTER
Monitor company [employer]  Length of monitor 4 weeks  Monitor ordered by RAMON  Patch ID [id number]  GIVEN TO FLOOR RN TO PLACE AND ACTIVATE AT DC.

## 2023-08-23 NOTE — DISCHARGE INSTRUCTIONS
FOLLOW-UP APPOINTMENTS    Follow-up appointment on 9/21/2023 at [facility] with Jessi Daniels CNP. [address] [facility] [address]: [phone]. If you are unable to make this appointment, please call to reschedule [phone]. Please arrive 15 minutes early to complete necessary paperwork. Directions to [facility]  [address] [place]. [address] exit. Right off exit. Cross over [employer]. Right on [address]. Left into hospital. Follow the signs to the emergency room ( turn left toward the Emergency room). Go right at the first stop sign. Just past the Emergency room at the second stop sign turn right and go up the ramp and park on the top level if possible. Go in the glass doors of the [facility] we on the top level of the garage [address]. As soon as you get in the door turn left and our office is the one with the glass doors.

## 2023-08-23 NOTE — CARE COORDINATION
CASE MANAGEMENT DISCHARGE SUMMARY      Discharge to: Home. Pt denied needs. New Durable Medical Equipment ordered/agency: NONE    Transportation:    Family/car:private.     Confirmed discharge plan with:     Patient: yes     Family:  yes     RN:Carmen

## 2023-08-23 NOTE — DISCHARGE SUMMARY
Hospital Medicine Discharge Summary    Patient: Wil Capone   : 1965     Admit Date: 2023   Discharge Date:   23  Disposition:  [x]Home   []HHC  []SNF  []Acute Rehab  []LTAC  []Hospice  Code status:  [x]Full  []DNR/CCA  []Limited (DNR/CCA with Do Not Intubate)  []DNRCC  Condition at Discharge: Stable  Primary Care Provider: No primary care provider on file. Admitting Provider: Jammie Brown MD  Discharge Provider: ANGELIC Mesa NP     Discharge Diagnoses: Active Hospital Problems    Diagnosis     Nonspecific ST-T wave electrocardiographic changes [R94.31]        Patient is requesting to be discharged today. She does feel better overall. She wants to trial prednisone for her pain. The pain in her neck and shoulder is reproducible with palpation. She denies any chest pain. Noted CT cardiac calcium scoring with total agatston calcium score of 1745. Discussed with cardiology today - OK for follow up outpatient. Appt is already set for 2023. Presenting Admission History: This is a pleasant 61-year-old female who presented to the [facility]. emergency department with complaints of right neck and arm pain that started yesterday. She says it was sudden on onset and she had no trauma or falls. She tells me that the pain is mainly in the right posterior shoulder region as well as into the right posterior neck area it does radiate down her arms. .  She has never had these symptoms prior to this presentation. She tells me she does not have any chest pain, left arm pain, shortness of breath, fevers, chills, cough or bowel or bladder issues. She does say that she has some occasional nausea but she believes this is brought on by this severe pain. She also mention that she has some shortness of breath when the pain presents.   She has primary hypertension and she has been working closely with her primary care physician Dr. Reba Walter of  and recently of an acute infarct. There is no evidence of hydrocephalus. Moderate to extensive periventricular deep and subcortical white matter low attenuation noted. [place] is noted in bilateral basal ganglia ORBITS: The visualized portion of the orbits demonstrate no acute abnormality. SINUSES: 3 cm lobulated retention cyst left maxillary sinus. Partial opacification right sphenoid sinus. Partial opacification of few left mastoid air cells. The other paranasal sinuses are clear. SOFT TISSUES/SKULL:  No acute abnormality of the visualized skull or soft tissues. No acute intracranial abnormality. Moderate to extensive white matter changes in the brain, likely related to chronic small vessel ischemia. CT CHEST WO CONTRAST    Result Date: 8/22/2023  EXAMINATION: CT OF THE CHEST WITHOUT CONTRAST 8/22/2023 11:17 am TECHNIQUE: CT of the chest was performed without the administration of intravenous contrast. Multiplanar reformatted images are provided for review. Automated exposure control, iterative reconstruction, and/or weight based adjustment of the mA/kV was utilized to reduce the radiation dose to as low as reasonably achievable. COMPARISON: None. HISTORY: ORDERING SYSTEM PROVIDED HISTORY: possible 7mm nodule left apical luing TECHNOLOGIST PROVIDED HISTORY: Reason for exam:->possible 7mm nodule left apical luing Reason for Exam: f/u lung nodule seen on prev CT FINDINGS: Mediastinum: Rim calcified nodule seen in right lobe of thyroid. Small mediastinal and hilar nodes are noted. Severe coronary artery calcification is seen. No pericardial effusion. No pericardial calcification noted. Small hiatal hernia seen. There is nonspecific thickening at the GE junction Lungs/pleura: Respiratory motion artifact limits evaluation of fine pulmonary parenchymal change. No obstructing endobronchial lesions are seen. A few scattered bandlike opacities are seen throughout the lungs. .  No suspicious pulmonary nodule Upper

## 2023-08-23 NOTE — PROGRESS NOTES
Patient discharged home via personal transportation. Patient sent with personal belongings and event monitor. Medications picked up from pharmacy. Patient states no further needs or questions at this time. LDAs and tele removed.

## 2023-08-23 NOTE — PLAN OF CARE
Problem: Discharge Planning  Goal: Discharge to home or other facility with appropriate resources  Outcome: Progressing  Flowsheets (Taken 8/22/2023 0905 by Seven Teague, RN)  Discharge to home or other facility with appropriate resources:   Identify barriers to discharge with patient and caregiver   Arrange for needed discharge resources and transportation as appropriate   Identify discharge learning needs (meds, wound care, etc)     Problem: Safety - Adult  Goal: Free from fall injury  Outcome: Progressing     Problem: ABCDS Injury Assessment  Goal: Absence of physical injury  Outcome: Progressing     Problem: Chronic Conditions and Co-morbidities  Goal: Patient's chronic conditions and co-morbidity symptoms are monitored and maintained or improved  Outcome: Progressing  Flowsheets (Taken 8/22/2023 0905 by Seven Teague, RN)  Care Plan - Patient's Chronic Conditions and Co-Morbidity Symptoms are Monitored and Maintained or Improved: Monitor and assess patient's chronic conditions and comorbid symptoms for stability, deterioration, or improvement     Problem: Pain  Goal: Verbalizes/displays adequate comfort level or baseline comfort level  Outcome: Progressing

## 2023-08-23 NOTE — CONSULTS
Department of Neurosurgery  Attending Consult Note        Reason for Consult:  Right shoulder/neck pain    HISTORY OF PRESENT ILLNESS:                The patient is a 59 y.o. y.o. female who presents with history of GBS as a child, previous strokes, HTN/HLD/DM who presented on 8/21/2023 for right neck/arm pain. Initially concern for cardiac event which workup up thus far has been negative. She states pain has improved since being admitted to the hospital. She denies any new weakness, numbness, tingling. She has baseline weakness and neurogenic bladder from her prior strokes and Guillain barre. Past Medical History:        Diagnosis Date    Axonal GBS (Guillain-Wayne syndrome) ([facility])     Closed displaced fracture of base of fifth metacarpal bone of left hand 2019    Dr. Izabela Anne    CVA (cerebral vascular accident) [facility]) left frontal lobe posterior corona radiata. 06/06/2023    DM II (diabetes mellitus, type II), controlled ([address])     HLD (hyperlipidemia)     HTN (hypertension)     Lacunar left stroke ([address]) 09/08/2021    Neurogenic bladder     Thyroid nodule 2009     Past Surgical History:    History reviewed. No pertinent surgical history.   Current Medications:   Current Facility-Administered Medications: ketorolac (TORADOL) injection 30 mg, 30 mg, IntraVENous, Q6H PRN  cyclobenzaprine (FLEXERIL) tablet 5 mg, 5 mg, Oral, TID PRN  clopidogrel (PLAVIX) tablet 75 mg, 75 mg, Oral, Daily  enoxaparin Sodium (LOVENOX) injection 30 mg, 30 mg, SubCUTAneous, BID  carvedilol (COREG) tablet 12.5 mg, 12.5 mg, Oral, BID WC  perflutren lipid microspheres (DEFINITY) injection 1.5 mL, 1.5 mL, IntraVENous, ONCE PRN  atorvastatin (LIPITOR) tablet 80 mg, 80 mg, Oral, Daily  hydroCHLOROthiazide (HYDRODIURIL) tablet 37.5 mg, 37.5 mg, Oral, Daily  sertraline (ZOLOFT) tablet 50 mg, 50 mg, Oral, QHS  sodium chloride flush 0.9 % injection 5-40 mL, 5-40 mL, IntraVENous, 2 times per day  sodium chloride flush 0.9 % injection 5-40 mL, 5-40 mL,

## 2023-08-23 NOTE — CARE COORDINATION
Chart reviewed day 2. Pt followed by IM, Cards,Neuro and Ortho Sx. Per Neuro Sx pt can follow up OP PRN. Pt w/cont L arm and neck pain, per IM would like Cards to see pt again prior to d/c decision. Pt IPTA w/daughter. Pt cont to deny needs. Will follow.  Electronically signed by Tayla Wood RN on 8/23/2023 at 1:05 PM

## 2023-09-22 PROCEDURE — 93228 REMOTE 30 DAY ECG REV/REPORT: CPT | Performed by: INTERNAL MEDICINE

## 2023-09-25 DIAGNOSIS — R94.31 NONSPECIFIC ST-T WAVE ELECTROCARDIOGRAPHIC CHANGES: Primary | ICD-10-CM

## 2023-09-26 ENCOUNTER — TELEPHONE (OUTPATIENT)
Dept: CARDIOLOGY CLINIC | Age: 58
End: 2023-09-26

## 2023-09-26 DIAGNOSIS — R94.31 NONSPECIFIC ST-T WAVE ELECTROCARDIOGRAPHIC CHANGES: Primary | ICD-10-CM

## 2023-09-26 DIAGNOSIS — I47.29 NSVT (NONSUSTAINED VENTRICULAR TACHYCARDIA) (HCC): ICD-10-CM

## 2023-09-26 DIAGNOSIS — I47.10 PSVT (PAROXYSMAL SUPRAVENTRICULAR TACHYCARDIA): ICD-10-CM

## 2023-09-26 DIAGNOSIS — Z76.89 ENCOUNTER TO ESTABLISH CARE: ICD-10-CM

## 2023-09-26 NOTE — TELEPHONE ENCOUNTER
----- Message from Garnetta Spatz, MD sent at 9/25/2023  3:14 PM EDT -----  See scanned report to communicate findings/plans to patient. Thanks.

## 2023-09-26 NOTE — TELEPHONE ENCOUNTER
Let Pt know PSVT vs NSVT noted. Recommend referral to EP for eval.       LMOM for pt to contact the office for monitor results.

## 2023-10-03 NOTE — TELEPHONE ENCOUNTER
10/03 2nd attempt, called pt @ [phone] and lvm to return call and schedule new pt appt w/ epmd per srj.  Next step will be to mail letter

## 2023-10-05 NOTE — TELEPHONE ENCOUNTER
Tried calling pt for 3rd attempt at [phone], no answer, lvm informing pt to call back to schedule as new pt with EP per srj. Created letter and placed in outgoing mail to return call to office.